# Patient Record
Sex: MALE | Race: BLACK OR AFRICAN AMERICAN | NOT HISPANIC OR LATINO | Employment: UNEMPLOYED | URBAN - NONMETROPOLITAN AREA
[De-identification: names, ages, dates, MRNs, and addresses within clinical notes are randomized per-mention and may not be internally consistent; named-entity substitution may affect disease eponyms.]

---

## 2021-12-19 ENCOUNTER — HOSPITAL ENCOUNTER (EMERGENCY)
Facility: HOSPITAL | Age: 51
Discharge: HOME | End: 2021-12-19
Attending: FAMILY MEDICINE | Admitting: FAMILY MEDICINE

## 2021-12-19 VITALS
WEIGHT: 210 LBS | RESPIRATION RATE: 20 BRPM | DIASTOLIC BLOOD PRESSURE: 69 MMHG | TEMPERATURE: 98.3 F | HEIGHT: 74 IN | BODY MASS INDEX: 26.95 KG/M2 | HEART RATE: 71 BPM | SYSTOLIC BLOOD PRESSURE: 118 MMHG | OXYGEN SATURATION: 97 %

## 2021-12-19 DIAGNOSIS — J02.9 PHARYNGITIS, UNSPECIFIED ETIOLOGY: Primary | ICD-10-CM

## 2021-12-19 LAB — S PYO AG THROAT QL: NEGATIVE

## 2021-12-19 PROCEDURE — 87081 CULTURE SCREEN ONLY: CPT | Performed by: FAMILY MEDICINE

## 2021-12-19 PROCEDURE — 87880 STREP A ASSAY W/OPTIC: CPT | Performed by: FAMILY MEDICINE

## 2021-12-19 PROCEDURE — 99283 EMERGENCY DEPT VISIT LOW MDM: CPT

## 2021-12-19 RX ORDER — CEPHALEXIN 500 MG/1
500 CAPSULE ORAL 3 TIMES DAILY
Qty: 30 CAPSULE | Refills: 0 | OUTPATIENT
Start: 2021-12-19 | End: 2021-12-26

## 2021-12-19 RX ORDER — CEPHALEXIN 500 MG/1
500 CAPSULE ORAL ONCE
Status: COMPLETED | OUTPATIENT
Start: 2021-12-19 | End: 2021-12-19

## 2021-12-19 RX ORDER — MELOXICAM 15 MG/1
15 TABLET ORAL DAILY
Qty: 30 TABLET | Refills: 0 | OUTPATIENT
Start: 2021-12-19 | End: 2021-12-26

## 2021-12-19 RX ADMIN — CEPHALEXIN 500 MG: 500 CAPSULE ORAL at 22:29

## 2021-12-20 NOTE — ED PROVIDER NOTES
Subjective   Sore throat x 4 days.       Sore Throat  Location:  Generalized  Quality:  Aching  Timing:  Constant  Chronicity:  Recurrent  Relieved by:  Nothing  Worsened by:  Eating  Associated symptoms: cough and shortness of breath    Associated symptoms: no abdominal pain, no adenopathy, no chest pain, no chills, no ear discharge, no ear pain, no epistaxis, no eye discharge, no fever, no headaches, no rash, no rhinorrhea, no trouble swallowing and no voice change    Cough  Associated symptoms: shortness of breath and sore throat    Associated symptoms: no chest pain, no chills, no diaphoresis, no ear pain, no eye discharge, no fever, no headaches, no myalgias, no rash, no rhinorrhea and no wheezing        Review of Systems   Constitutional: Positive for activity change. Negative for appetite change, chills, diaphoresis, fatigue and fever.   HENT: Positive for congestion and sore throat. Negative for ear discharge, ear pain, nosebleeds, rhinorrhea, sinus pressure, trouble swallowing and voice change.    Eyes: Negative for discharge and redness.   Respiratory: Positive for cough and shortness of breath. Negative for apnea, chest tightness and wheezing.    Cardiovascular: Negative for chest pain.   Gastrointestinal: Negative for abdominal pain, diarrhea, nausea and vomiting.   Endocrine: Negative for polyuria.   Genitourinary: Negative for dysuria, frequency and urgency.   Musculoskeletal: Negative for myalgias and neck pain.   Skin: Negative for color change and rash.   Allergic/Immunologic: Negative for immunocompromised state.   Neurological: Negative for dizziness, seizures, syncope, weakness, light-headedness and headaches.   Hematological: Negative for adenopathy. Does not bruise/bleed easily.   Psychiatric/Behavioral: Negative for behavioral problems and confusion.   All other systems reviewed and are negative.      History reviewed. No pertinent past medical history.    No Known Allergies    History  reviewed. No pertinent surgical history.    History reviewed. No pertinent family history.    Social History     Socioeconomic History   • Marital status: Single   Tobacco Use   • Smoking status: Never Smoker   • Smokeless tobacco: Never Used   Substance and Sexual Activity   • Alcohol use: Not Currently   • Drug use: Never           Objective   Physical Exam  Vitals and nursing note reviewed.   Constitutional:       Appearance: He is well-developed.   HENT:      Head: Normocephalic and atraumatic.      Nose: Nose normal.      Mouth/Throat:      Pharynx: Pharyngeal swelling and posterior oropharyngeal erythema present.   Eyes:      General: No scleral icterus.        Right eye: No discharge.         Left eye: No discharge.      Conjunctiva/sclera: Conjunctivae normal.      Pupils: Pupils are equal, round, and reactive to light.   Neck:      Trachea: No tracheal deviation.   Cardiovascular:      Rate and Rhythm: Normal rate and regular rhythm.      Heart sounds: Normal heart sounds. No murmur heard.      Pulmonary:      Effort: Pulmonary effort is normal. No respiratory distress.      Breath sounds: Normal breath sounds. No stridor. No wheezing or rales.   Abdominal:      General: Bowel sounds are normal. There is no distension.      Palpations: Abdomen is soft. There is no mass.      Tenderness: There is no abdominal tenderness. There is no guarding or rebound.   Musculoskeletal:      Cervical back: Normal range of motion and neck supple.   Skin:     General: Skin is warm and dry.      Findings: No erythema or rash.   Neurological:      Mental Status: He is alert and oriented to person, place, and time.      Coordination: Coordination normal.   Psychiatric:         Behavior: Behavior normal.         Thought Content: Thought content normal.         Procedures           ED Course           The patient has acute pharyngitis/tonsillitis.  The patient was prescribed antibiotics today and a strep test or culture was  performed.        Labs Reviewed   RAPID STREP A SCREEN - Normal   BETA HEMOLYTIC STREP CULTURE, THROAT - Normal    Narrative:     Group A Strep incidence is low in adults. Positive culture for Beta hemolytic Streptococcus species can reflect colonization and not true infection. Please correlate clinically.       No orders to display                                     Labs Reviewed   RAPID STREP A SCREEN - Normal   BETA HEMOLYTIC STREP CULTURE, THROAT - Normal    Narrative:     Group A Strep incidence is low in adults. Positive culture for Beta hemolytic Streptococcus species can reflect colonization and not true infection. Please correlate clinically.       No orders to display                 MDM    Final diagnoses:   Pharyngitis, unspecified etiology       ED Disposition  ED Disposition     ED Disposition Condition Comment    Discharge Stable           [facility]  [address] [place] [address] [phone]  In 3 days           Medication List      New Prescriptions    cephalexin 500 MG capsule  Commonly known as: KEFLEX  Take 1 capsule by mouth 3 (Three) Times a Day.     meloxicam 15 MG tablet  Commonly known as: MOBIC  Take 1 tablet by mouth Daily.           Where to Get Your Medications      These medications were sent to [id number] - [place], [place] - [facility] [facility] AT [facility] [address] - [phone]  - [phone] [address] [address]    Phone: [phone]   · cephalexin 500 MG capsule  · meloxicam 15 MG tablet          Rickey Merino MD  12/25/21 5057

## 2021-12-22 LAB — BACTERIA SPEC AEROBE CULT: NORMAL

## 2021-12-26 ENCOUNTER — HOSPITAL ENCOUNTER (EMERGENCY)
Facility: HOSPITAL | Age: 51
Discharge: HOME | End: 2021-12-26
Attending: EMERGENCY MEDICINE | Admitting: EMERGENCY MEDICINE

## 2021-12-26 ENCOUNTER — APPOINTMENT (OUTPATIENT)
Dept: GENERAL RADIOLOGY | Facility: HOSPITAL | Age: 51
End: 2021-12-26

## 2021-12-26 VITALS
OXYGEN SATURATION: 98 % | SYSTOLIC BLOOD PRESSURE: 121 MMHG | HEIGHT: 74 IN | WEIGHT: 210 LBS | TEMPERATURE: 97.7 F | BODY MASS INDEX: 26.95 KG/M2 | RESPIRATION RATE: 17 BRPM | HEART RATE: 70 BPM | DIASTOLIC BLOOD PRESSURE: 70 MMHG

## 2021-12-26 DIAGNOSIS — U07.1 COVID-19 VIRUS INFECTION: Primary | ICD-10-CM

## 2021-12-26 DIAGNOSIS — J02.9 PHARYNGITIS, UNSPECIFIED ETIOLOGY: ICD-10-CM

## 2021-12-26 LAB
FLUAV SUBTYP SPEC NAA+PROBE: NOT DETECTED
FLUBV RNA ISLT QL NAA+PROBE: NOT DETECTED
HETEROPH AB SER QL LA: NEGATIVE
SARS-COV-2 RNA PNL SPEC NAA+PROBE: DETECTED

## 2021-12-26 PROCEDURE — 86308 HETEROPHILE ANTIBODY SCREEN: CPT | Performed by: EMERGENCY MEDICINE

## 2021-12-26 PROCEDURE — 36415 COLL VENOUS BLD VENIPUNCTURE: CPT

## 2021-12-26 PROCEDURE — 70360 X-RAY EXAM OF NECK: CPT

## 2021-12-26 PROCEDURE — 87636 SARSCOV2 & INF A&B AMP PRB: CPT | Performed by: EMERGENCY MEDICINE

## 2021-12-26 PROCEDURE — C9803 HOPD COVID-19 SPEC COLLECT: HCPCS

## 2021-12-26 PROCEDURE — 99283 EMERGENCY DEPT VISIT LOW MDM: CPT

## 2021-12-26 RX ORDER — CETIRIZINE HYDROCHLORIDE, PSEUDOEPHEDRINE HYDROCHLORIDE 5; 120 MG/1; MG/1
1 TABLET, FILM COATED, EXTENDED RELEASE ORAL 2 TIMES DAILY PRN
Qty: 14 TABLET | Refills: 0 | Status: SHIPPED | OUTPATIENT
Start: 2021-12-26

## 2021-12-26 NOTE — ED PROVIDER NOTES
Subjective   50yo male presents ED c/o 8d hx sore throat.  ROS neg fever/rhinorrhea/otalgia/cough/soa.  Pt notably seen [facility] 12.19.2021 with rapid strep (-), throat culture (-).      History provided by:  Patient  Sore Throat  Duration:  8 days  Associated symptoms: no ear pain and no rhinorrhea        Review of Systems   Constitutional: Negative.    HENT: Positive for sore throat. Negative for ear pain, rhinorrhea, sinus pressure and sinus pain.    Respiratory: Negative.    Cardiovascular: Negative.    Gastrointestinal: Negative.    All other systems reviewed and are negative.      History reviewed. No pertinent past medical history.    No Known Allergies    History reviewed. No pertinent surgical history.    History reviewed. No pertinent family history.    Social History     Socioeconomic History   • Marital status: Single   Tobacco Use   • Smoking status: Never Smoker   • Smokeless tobacco: Never Used   Substance and Sexual Activity   • Alcohol use: Not Currently   • Drug use: Never           Objective   Physical Exam  Vitals and nursing note reviewed.   Constitutional:       Appearance: Normal appearance.   HENT:      Head: Normocephalic and atraumatic.      Right Ear: Tympanic membrane, ear canal and external ear normal.      Left Ear: Tympanic membrane, ear canal and external ear normal.      Nose: Nose normal.      Mouth/Throat:      Lips: Pink.      Mouth: Mucous membranes are moist.      Pharynx: Oropharynx is clear. Uvula midline. No oropharyngeal exudate or posterior oropharyngeal erythema.      Tonsils: No tonsillar exudate or tonsillar abscesses.   Eyes:      Pupils: Pupils are equal, round, and reactive to light.   Cardiovascular:      Rate and Rhythm: Normal rate and regular rhythm.      Pulses: Normal pulses.      Heart sounds: Normal heart sounds. No murmur heard.  No friction rub. No gallop.    Pulmonary:      Effort: Pulmonary effort is normal. No respiratory distress.      Breath sounds:  Normal breath sounds. No stridor. No wheezing, rhonchi or rales.   Abdominal:      General: Abdomen is flat. Bowel sounds are normal.      Palpations: Abdomen is soft.   Musculoskeletal:         General: Normal range of motion.      Cervical back: Normal range of motion and neck supple. No rigidity.   Lymphadenopathy:      Cervical: No cervical adenopathy.   Skin:     General: Skin is warm and dry.   Neurological:      Mental Status: He is alert.      GCS: GCS eye subscore is 4. GCS verbal subscore is 5. GCS motor subscore is 6.         Procedures           ED Course      XR Neck Soft Tissue    Result Date: 12/26/2021  Narrative: Two view soft tissue tissue neck HISTORY: Odynophagia AP and lateral views obtained. COMPARISON: None FINDINGS: Unremarkable epiglottis, subglottic airway and retropharyngeal soft tissues. No radiopaque foreign body. Degenerative changes C4-C7 with moderate sized anterior osteophytes at C4-5.     Impression: CONCLUSION: Normal tissue neck. Degenerative changes C4-C7 with moderate sized anterior osteophytes at C4-5. [id number] Electronically signed by:  Ivan Camara MD  12/26/2021 12:47 PM CST Workstation: [id number]    Labs Reviewed   COVID-19 AND FLU A/B, NP SWAB IN TRANSPORT MEDIA 8-12 HR TAT - Abnormal; Notable for the following components:       Result Value    COVID19 Detected (*)     All other components within normal limits    Narrative:     Fact sheet for providers: https://www.fda.gov/media/127206/download    Fact sheet for patients: https://www.fda.gov/media/935884/download    Test performed by PCR.  Influenza A and Influenza B negative results should be considered presumptive in samples that have a positive SARS-CoV-2 result.    Competitive inhibition studies showed that SARS-CoV-2 virus, when present at concentrations above 3.6E+04 copies/mL, can inhibit the detection and amplification of influenza A and influenza B virus RNA if present at or below 1.8E+02 copies/mL or 4.9E+02  copies/mL, respectively, and may lead to false negative influenza virus results. If co-infection with influenza A or influenza B virus is suspected in samples with a positive SARS-CoV-2 result, the sample should be re-tested with another FDA cleared, approved, or authorized influenza test, if influenza virus detection would change clinical management.   MONONUCLEOSIS SCREEN - Normal                                                MDM    Final diagnoses:   COVID-19 virus infection   Pharyngitis, unspecified etiology       ED Disposition  ED Disposition     ED Disposition Condition Comment    Discharge Daniel Ramirez MD  [address] [place] [facility] [address] [phone]    In 2 days           Medication List      New Prescriptions    cetirizine-pseudoephedrine 5-120 MG per 12 hr tablet  Commonly known as: ZyrTEC-D  Take 1 tablet by mouth 2 (Two) Times a Day As Needed for Rhinitis.        Stop    cephalexin 500 MG capsule  Commonly known as: KEFLEX     meloxicam 15 MG tablet  Commonly known as: MOBIC           Where to Get Your Medications      These medications were sent to [id number] - [place], [place] - [address] AT [facility] [address] - [phone]  - [phone] FX  [address] [facility] [address]    Phone: [phone]   · cetirizine-pseudoephedrine 5-120 MG per 12 hr tablet          Matthias Kumar MD  12/26/21 6800

## 2021-12-26 NOTE — DISCHARGE INSTRUCTIONS
Self quarantine x10 days  Return ED shortness of air, chest pain, vomiting, dehydration, worse condition, any other concerns

## 2021-12-26 NOTE — ED TRIAGE NOTES
"Pt comes in today reporting a sore throat despite taking abx that were prescribed to him here about a week ago. He states that he \"can't eat\" and that he is \"spiking a fever.\"    Pt is alert and oriented x4. Tonsils slightly enlarged. Mouth moist. Patient able to speak with a normal voice and control secretions. Airway patent.   "

## 2021-12-26 NOTE — ED NOTES
Patient now agreeable to COVID swab. MD again made aware.      Veena Schofield, RN  12/26/21 9683